# Patient Record
Sex: MALE | URBAN - METROPOLITAN AREA
[De-identification: names, ages, dates, MRNs, and addresses within clinical notes are randomized per-mention and may not be internally consistent; named-entity substitution may affect disease eponyms.]

---

## 2023-06-07 ENCOUNTER — OFFICE VISIT (OUTPATIENT)
Dept: PRIMARY CARE | Facility: CLINIC | Age: 48
End: 2023-06-07
Payer: COMMERCIAL

## 2023-06-07 VITALS
SYSTOLIC BLOOD PRESSURE: 135 MMHG | BODY MASS INDEX: 23.05 KG/M2 | WEIGHT: 161 LBS | OXYGEN SATURATION: 97 % | DIASTOLIC BLOOD PRESSURE: 86 MMHG | TEMPERATURE: 97.6 F | HEART RATE: 68 BPM | HEIGHT: 70 IN

## 2023-06-07 DIAGNOSIS — F43.0: Primary | ICD-10-CM

## 2023-06-07 PROCEDURE — 99213 OFFICE O/P EST LOW 20 MIN: CPT | Performed by: INTERNAL MEDICINE

## 2023-06-07 NOTE — PROGRESS NOTES
"Subjective   Troy Clayton is a 47 y.o. male who presents for Headache and Anxiety.  HPI    Fairly cute onset, thinks he may be experiencing anxiety/panic.  In many ways similar to episodes he had a last year but this time almost all of his symptoms were in his head.  Traveling for work, on Tuesday, driving back felt chest tightness, headache, head pressure.  When he landed in [place] he felt a lot of pressure in his head and felt very uncomfortable, rested, took ibuprofen, make sure to drink a lot of fluids.  Recovered after about 2 hours and felt fine within the next day when going to the plant he again developed feeling of fuzziness and pressure and had to leave.  Again, 2 or 3 hours later he felt totally fine.  Feels very exhausted despite getting better night sleep.  Dealing with a lot of stressors in the community because of his position although he has had these stressors for a long time and do not necessarily always cause the symptoms.  Described as a very uncomfortable feeling in uneasiness and heaviness in his head.  We did discuss and realized that all of the worst symptoms occurred when he was feeling trapped such as in the car or in the plant, had a sensation of needing to escape.  He did recently get a death threat on top of the other stressors and has had poor sleep because of the new baby.  Wants to rule out other causes such as strep as a lot of strep in the house and he has had a very mild sore throat, also has been his regular seasonal allergies with congestion.  Objective   /86   Pulse 68   Temp 36.4 °C (97.6 °F)   Ht 1.778 m (5' 10\")   Wt 73 kg (161 lb)   SpO2 97%   BMI 23.10 kg/m²    Physical Exam  Gen: NAD, pleasant, A&Ox3  HEENT: PERRL, EOMI, MMM, OP clear  Neck: supple, no thyromegaly, no JVD, normal carotid upstroke  Pulm: lungs CTAB, good air movement  CV: RRR, no m/r/g, 2+ DP pulses  Abd: NABS, soft, NT, ND no HSM  Ext: no peripheral edema  Neuro: CN II-XII intact, no focal " sensory or motor deficits, normal reflexes  Assessment/Plan     Suspected stress reaction: May be manifesting based on underlying symptoms such as head congestion so feeling more distress in his head as opposed to last year when he had more of the chest symptoms.  Can try some antihistamines instead of ignoring his allergy symptoms.  Discussed stressors and management, agrees to consider speaking with a psychologist to help compartmentalize his responsibilities and various aspects of his life.  Problem List Items Addressed This Visit    None           Nik Larry MD

## 2023-06-09 PROBLEM — F43.0: Status: ACTIVE | Noted: 2023-06-09

## 2023-06-20 DIAGNOSIS — J30.1 HAY FEVER: Primary | ICD-10-CM

## 2023-06-20 RX ORDER — CETIRIZINE HYDROCHLORIDE 10 MG/1
10 TABLET ORAL DAILY
Qty: 30 TABLET | Refills: 2 | Status: SHIPPED | OUTPATIENT
Start: 2023-06-20 | End: 2023-07-05 | Stop reason: SDUPTHER

## 2023-06-20 NOTE — PROGRESS NOTES
Subjective   Troy Clayton is a 47 y.o. male who presents for No chief complaint on file..  HPI    Objective   There were no vitals taken for this visit.   Physical Exam    Assessment/Plan   Problem List Items Addressed This Visit    None           Nik Larry MD

## 2023-07-05 ENCOUNTER — OFFICE VISIT (OUTPATIENT)
Dept: PRIMARY CARE | Facility: CLINIC | Age: 48
End: 2023-07-05
Payer: COMMERCIAL

## 2023-07-05 VITALS
TEMPERATURE: 97.9 F | WEIGHT: 162 LBS | HEART RATE: 74 BPM | BODY MASS INDEX: 23.24 KG/M2 | DIASTOLIC BLOOD PRESSURE: 70 MMHG | OXYGEN SATURATION: 96 % | SYSTOLIC BLOOD PRESSURE: 128 MMHG

## 2023-07-05 DIAGNOSIS — J30.1 HAY FEVER: ICD-10-CM

## 2023-07-05 DIAGNOSIS — E55.9 VITAMIN D DEFICIENCY: ICD-10-CM

## 2023-07-05 DIAGNOSIS — Z12.12 ENCOUNTER FOR COLORECTAL CANCER SCREENING: ICD-10-CM

## 2023-07-05 DIAGNOSIS — F43.0: ICD-10-CM

## 2023-07-05 DIAGNOSIS — Z00.00 ENCOUNTER FOR WELLNESS EXAMINATION: Primary | ICD-10-CM

## 2023-07-05 DIAGNOSIS — Z12.11 ENCOUNTER FOR COLORECTAL CANCER SCREENING: ICD-10-CM

## 2023-07-05 PROBLEM — R07.89 CHEST PAIN, ATYPICAL: Status: RESOLVED | Noted: 2023-07-05 | Resolved: 2023-07-05

## 2023-07-05 PROBLEM — F41.0 PANIC ATTACKS: Status: ACTIVE | Noted: 2023-07-05

## 2023-07-05 PROBLEM — K12.0 APHTHOUS ULCER: Status: RESOLVED | Noted: 2023-07-05 | Resolved: 2023-07-05

## 2023-07-05 PROBLEM — L74.513 HYPERHIDROSIS OF FEET: Status: RESOLVED | Noted: 2023-07-05 | Resolved: 2023-07-05

## 2023-07-05 LAB
ALANINE AMINOTRANSFERASE (SGPT) (U/L) IN SER/PLAS: 16 U/L (ref 10–52)
ALBUMIN (G/DL) IN SER/PLAS: 4.6 G/DL (ref 3.4–5)
ALKALINE PHOSPHATASE (U/L) IN SER/PLAS: 56 U/L (ref 33–120)
ANION GAP IN SER/PLAS: 12 MMOL/L (ref 10–20)
ASPARTATE AMINOTRANSFERASE (SGOT) (U/L) IN SER/PLAS: 15 U/L (ref 9–39)
BILIRUBIN TOTAL (MG/DL) IN SER/PLAS: 0.6 MG/DL (ref 0–1.2)
CALCIDIOL (25 OH VITAMIN D3) (NG/ML) IN SER/PLAS: 24 NG/ML
CALCIUM (MG/DL) IN SER/PLAS: 9.8 MG/DL (ref 8.6–10.6)
CARBON DIOXIDE, TOTAL (MMOL/L) IN SER/PLAS: 29 MMOL/L (ref 21–32)
CHLORIDE (MMOL/L) IN SER/PLAS: 105 MMOL/L (ref 98–107)
CHOLESTEROL (MG/DL) IN SER/PLAS: 201 MG/DL (ref 0–199)
CHOLESTEROL IN HDL (MG/DL) IN SER/PLAS: 64.3 MG/DL
CHOLESTEROL/HDL RATIO: 3.1
CREATININE (MG/DL) IN SER/PLAS: 0.96 MG/DL (ref 0.5–1.3)
ERYTHROCYTE DISTRIBUTION WIDTH (RATIO) BY AUTOMATED COUNT: 12.6 % (ref 11.5–14.5)
ERYTHROCYTE MEAN CORPUSCULAR HEMOGLOBIN CONCENTRATION (G/DL) BY AUTOMATED: 33.5 G/DL (ref 32–36)
ERYTHROCYTE MEAN CORPUSCULAR VOLUME (FL) BY AUTOMATED COUNT: 88 FL (ref 80–100)
ERYTHROCYTES (10*6/UL) IN BLOOD BY AUTOMATED COUNT: 4.66 X10E12/L (ref 4.5–5.9)
GFR MALE: >90 ML/MIN/1.73M2
GLUCOSE (MG/DL) IN SER/PLAS: 91 MG/DL (ref 74–99)
HEMATOCRIT (%) IN BLOOD BY AUTOMATED COUNT: 40.9 % (ref 41–52)
HEMOGLOBIN (G/DL) IN BLOOD: 13.7 G/DL (ref 13.5–17.5)
LDL: 106 MG/DL (ref 0–99)
LEUKOCYTES (10*3/UL) IN BLOOD BY AUTOMATED COUNT: 5.2 X10E9/L (ref 4.4–11.3)
NRBC (PER 100 WBCS) BY AUTOMATED COUNT: 0 /100 WBC (ref 0–0)
PLATELETS (10*3/UL) IN BLOOD AUTOMATED COUNT: 226 X10E9/L (ref 150–450)
POTASSIUM (MMOL/L) IN SER/PLAS: 4.4 MMOL/L (ref 3.5–5.3)
PROSTATE SPECIFIC AG (NG/ML) IN SER/PLAS: 0.34 NG/ML (ref 0–4)
PROTEIN TOTAL: 7.1 G/DL (ref 6.4–8.2)
SODIUM (MMOL/L) IN SER/PLAS: 142 MMOL/L (ref 136–145)
THYROTROPIN (MIU/L) IN SER/PLAS BY DETECTION LIMIT <= 0.05 MIU/L: 1.28 MIU/L (ref 0.44–3.98)
TRIGLYCERIDE (MG/DL) IN SER/PLAS: 154 MG/DL (ref 0–149)
UREA NITROGEN (MG/DL) IN SER/PLAS: 14 MG/DL (ref 6–23)
VLDL: 31 MG/DL (ref 0–40)

## 2023-07-05 PROCEDURE — 82306 VITAMIN D 25 HYDROXY: CPT

## 2023-07-05 PROCEDURE — 84153 ASSAY OF PSA TOTAL: CPT

## 2023-07-05 PROCEDURE — 84443 ASSAY THYROID STIM HORMONE: CPT

## 2023-07-05 PROCEDURE — 80053 COMPREHEN METABOLIC PANEL: CPT

## 2023-07-05 PROCEDURE — 99212 OFFICE O/P EST SF 10 MIN: CPT | Performed by: INTERNAL MEDICINE

## 2023-07-05 PROCEDURE — 99396 PREV VISIT EST AGE 40-64: CPT | Performed by: INTERNAL MEDICINE

## 2023-07-05 PROCEDURE — 80061 LIPID PANEL: CPT

## 2023-07-05 PROCEDURE — 85027 COMPLETE CBC AUTOMATED: CPT

## 2023-07-05 RX ORDER — CHOLECALCIFEROL (VITAMIN D3) 25 MCG
50 TABLET ORAL DAILY
Qty: 180 TABLET | Refills: 3 | Status: SHIPPED | OUTPATIENT
Start: 2023-07-05 | End: 2024-07-04

## 2023-07-05 RX ORDER — CETIRIZINE HYDROCHLORIDE 10 MG/1
10 TABLET ORAL DAILY
Qty: 30 TABLET | Refills: 2 | Status: SHIPPED | OUTPATIENT
Start: 2023-07-05 | End: 2023-08-22

## 2023-07-05 RX ORDER — CHOLECALCIFEROL (VITAMIN D3) 25 MCG
TABLET ORAL
COMMUNITY
Start: 2021-02-09 | End: 2023-07-05 | Stop reason: SDUPTHER

## 2023-07-05 RX ORDER — ALPRAZOLAM 0.25 MG/1
TABLET ORAL
COMMUNITY
Start: 2022-09-14 | End: 2023-08-15 | Stop reason: SDUPTHER

## 2023-07-05 NOTE — PROGRESS NOTES
"Subjective   Troy Clayton is a 47 y.o. male who presents for Annual Exam.  HPI  Interim:  -See visit 6/7/2023 regarding suspected stress reaction and anxiety.  He has been doing more walking and swimming, exercise certainly helps and he does feel better overall.  He has had a recurrence of the symptoms of chest tightness and uneasiness on a few occasions, most notably when sitting at the [employer] table and driving to [place].  Both situations where he was in a sense \"stuck\" sitting and responsible to continue doing what he was doing.  Continued stress through his position.  He has been speaking to his friend and retired psychologist Dr. ePtit looking to start regular therapy.  Also trying to set boundaries and separate from stretches when possible.  Working on relaxation exercises and breathing.  He does have that alprazolam on hand but has not taken them.    We did put him on cetirizine and that seems to have helped with head congestion.    Continue seeing Dr. Holman for PT for his shoulders which seems to be working pretty well.    Family history:  Father (@ age 56) and paternal grandfather-CVA, heart disease and diabetes  Mother and brother-kidney stones  Mother-breast cancer  Maternal grandfather-lung cancer but longtime smoker       Objective   /70   Pulse 74   Temp 36.6 °C (97.9 °F)   Wt 73.5 kg (162 lb)   SpO2 96%   BMI 23.24 kg/m²    Physical Exam  Gen: NAD, pleasant, A&Ox3  HEENT: PERRL, EOMI, MMM, OP clear  Neck: supple, no thyromegaly, no JVD, normal carotid upstroke  Pulm: lungs CTAB, good air movement  CV: RRR, no m/r/g, 2+ DP pulses  Abd: NABS, soft, NT, ND no HSM  Ext: no peripheral edema  Neuro: CN II-XII intact, no focal sensory or motor deficits, normal reflexes  Assessment/Plan     Anxiety/Panic attacks: Discussed anxiety management, mindfulness, speaking to a therapist. Trial of as needed Xanax     Elevated blood pressure: Mostly resolved, acceptable today     Vitamin D " deficiency: Recheck, continue supplementation     Health maintenance  -Last colonoscopy: Initiate at 47, prefers Cologuard  -PSA: Check baseline  -Smoking history: Never  -Counseled regarding diet and exercise  -Immunizations: Check record  -Followup annually and as needed   Problem List Items Addressed This Visit    None           Nik Larry MD

## 2023-07-20 LAB — NONINV COLON CA DNA+OCC BLD SCRN STL QL: NEGATIVE

## 2023-08-15 DIAGNOSIS — F41.0 PANIC ATTACKS: Primary | ICD-10-CM

## 2023-08-15 RX ORDER — ALPRAZOLAM 0.25 MG/1
0.25 TABLET ORAL 3 TIMES DAILY PRN
Qty: 14 TABLET | Refills: 0 | Status: SHIPPED | OUTPATIENT
Start: 2023-08-15 | End: 2024-03-08 | Stop reason: SDUPTHER

## 2023-08-16 RX ORDER — ALPRAZOLAM 0.25 MG/1
TABLET ORAL
Qty: 7 TABLET | OUTPATIENT
Start: 2023-08-16

## 2023-08-22 DIAGNOSIS — J30.1 HAY FEVER: ICD-10-CM

## 2023-08-22 RX ORDER — CETIRIZINE HYDROCHLORIDE 10 MG/1
10 TABLET ORAL DAILY
Qty: 90 TABLET | Refills: 3 | Status: SHIPPED | OUTPATIENT
Start: 2023-08-22

## 2023-11-17 DIAGNOSIS — M25.519 CHRONIC SHOULDER PAIN, UNSPECIFIED LATERALITY: Primary | ICD-10-CM

## 2023-11-17 DIAGNOSIS — G89.29 CHRONIC SHOULDER PAIN, UNSPECIFIED LATERALITY: Primary | ICD-10-CM

## 2023-11-17 NOTE — PROGRESS NOTES
Subjective   Troy Clayton is a 48 y.o. male who presents for No chief complaint on file..  HPI    Objective   There were no vitals taken for this visit.   Physical Exam    Assessment/Plan   Problem List Items Addressed This Visit    None           Nik Larry MD    B positive

## 2023-11-27 ENCOUNTER — OFFICE VISIT (OUTPATIENT)
Dept: ORTHOPEDIC SURGERY | Facility: HOSPITAL | Age: 48
End: 2023-11-27
Payer: COMMERCIAL

## 2023-11-27 DIAGNOSIS — G89.29 CHRONIC RIGHT SHOULDER PAIN: Primary | ICD-10-CM

## 2023-11-27 DIAGNOSIS — M25.511 CHRONIC RIGHT SHOULDER PAIN: Primary | ICD-10-CM

## 2023-11-27 PROCEDURE — 99214 OFFICE O/P EST MOD 30 MIN: CPT | Performed by: ORTHOPAEDIC SURGERY

## 2023-11-27 PROCEDURE — 1036F TOBACCO NON-USER: CPT | Performed by: ORTHOPAEDIC SURGERY

## 2023-11-27 PROCEDURE — 20610 DRAIN/INJ JOINT/BURSA W/O US: CPT | Performed by: ORTHOPAEDIC SURGERY

## 2023-11-27 PROCEDURE — 99214 OFFICE O/P EST MOD 30 MIN: CPT | Mod: 25 | Performed by: ORTHOPAEDIC SURGERY

## 2023-11-27 PROCEDURE — 2500000004 HC RX 250 GENERAL PHARMACY W/ HCPCS (ALT 636 FOR OP/ED): Performed by: ORTHOPAEDIC SURGERY

## 2023-11-27 PROCEDURE — 2500000005 HC RX 250 GENERAL PHARMACY W/O HCPCS: Performed by: ORTHOPAEDIC SURGERY

## 2023-11-27 RX ORDER — TRIAMCINOLONE ACETONIDE 40 MG/ML
40 INJECTION, SUSPENSION INTRA-ARTICULAR; INTRAMUSCULAR
Status: COMPLETED | OUTPATIENT
Start: 2023-11-27 | End: 2023-11-27

## 2023-11-27 RX ORDER — LIDOCAINE HYDROCHLORIDE 10 MG/ML
1 INJECTION INFILTRATION; PERINEURAL
Status: COMPLETED | OUTPATIENT
Start: 2023-11-27 | End: 2023-11-27

## 2023-11-27 RX ORDER — LIDOCAINE HYDROCHLORIDE 10 MG/ML
4 INJECTION INFILTRATION; PERINEURAL
Status: COMPLETED | OUTPATIENT
Start: 2023-11-27 | End: 2023-11-27

## 2023-11-27 RX ORDER — TRIAMCINOLONE ACETONIDE 40 MG/ML
10 INJECTION, SUSPENSION INTRA-ARTICULAR; INTRAMUSCULAR
Status: COMPLETED | OUTPATIENT
Start: 2023-11-27 | End: 2023-11-27

## 2023-11-27 RX ADMIN — LIDOCAINE HYDROCHLORIDE 4 ML: 10 INJECTION, SOLUTION INFILTRATION; PERINEURAL at 13:56

## 2023-11-27 RX ADMIN — TRIAMCINOLONE ACETONIDE 10 MG: 400 INJECTION, SUSPENSION INTRA-ARTICULAR; INTRAMUSCULAR at 13:59

## 2023-11-27 RX ADMIN — TRIAMCINOLONE ACETONIDE 40 MG: 200 INJECTION, SUSPENSION INTRA-ARTICULAR; INTRAMUSCULAR at 13:56

## 2023-11-27 RX ADMIN — LIDOCAINE HYDROCHLORIDE 1 ML: 10 INJECTION, SOLUTION INFILTRATION; PERINEURAL at 13:59

## 2023-11-27 NOTE — PROGRESS NOTES
L Inj/Asp: R subacromial bursa on 11/27/2023 1:56 PM  Indications: pain  Details: 22 G needle, posterior approach  Medications: 40 mg triamcinolone acetonide 40 mg/mL; 4 mL lidocaine 10 mg/mL (1 %)  Procedure, treatment alternatives, risks and benefits explained, specific risks discussed. Consent was given by the patient.

## 2023-11-27 NOTE — PROGRESS NOTES
Patient here for evaluation of his right shoulder he has recurrent pain in his right shoulder is actually been persistent for several months.  He has been seeing a physical therapist regularly and has improved slightly continues to have pain in the shoulder into the deltoid worse when he lays on his side.  Worse with overhead usage.    The patient is pleasant and cooperative.  The patient is alert and oriented ×3.  Auditory function is intact.  The patient is a good historian.  The patient is not in acute distress.  Eye exam significant for nonicteric sclera, intact ocular muscle movement.  Breathing is rhythmic symmetric and nonlabored.  Right upper extremity integument intact no lesions scars lacerations abrasions or contusions radial pulse easily palpable brisk capillary refill light touch sensation intact of the right upper extremity.  Patient is active shoulder motion elevation 180 external rotation and abduction 95 internal rotation T8.  Motor power abduction 5 external rotation 5 internal rotation 5.  No apprehension.  Pain with extremes of range of motion particularly abduction in neutral rotation.    X-rays show preservation of the subacromial glenohumeral space no fracture dislocation subluxation or arthritic changes noted.    Right shoulder impingement    Patient has impingement likely associated with subacromial bursitis.  Is been recalcitrant to treatment and I recommended further treatment by injection.  Injection was performed today and tolerated well.  I have asked patient to call and advise us of the results of the injection in about a week.L Inj/Asp: R subacromial bursa on 11/27/2023 1:59 PM  Indications: pain  Details: 22 G needle, posterior approach  Medications: 10 mg triamcinolone acetonide 40 mg/mL; 1 mL lidocaine 10 mg/mL (1 %)

## 2024-03-08 DIAGNOSIS — F41.0 PANIC ATTACKS: ICD-10-CM

## 2024-03-08 RX ORDER — ALPRAZOLAM 0.25 MG/1
0.25 TABLET ORAL 3 TIMES DAILY PRN
Qty: 14 TABLET | Refills: 0 | Status: SHIPPED | OUTPATIENT
Start: 2024-03-08 | End: 2024-03-15

## 2024-04-11 ENCOUNTER — OFFICE VISIT (OUTPATIENT)
Dept: PRIMARY CARE | Facility: CLINIC | Age: 49
End: 2024-04-11
Payer: COMMERCIAL

## 2024-04-11 VITALS
HEART RATE: 70 BPM | WEIGHT: 154 LBS | DIASTOLIC BLOOD PRESSURE: 77 MMHG | OXYGEN SATURATION: 97 % | BODY MASS INDEX: 22.1 KG/M2 | SYSTOLIC BLOOD PRESSURE: 135 MMHG | TEMPERATURE: 98.6 F

## 2024-04-11 DIAGNOSIS — J01.10 ACUTE NON-RECURRENT FRONTAL SINUSITIS: ICD-10-CM

## 2024-04-11 DIAGNOSIS — B34.9 VIRAL SYNDROME: ICD-10-CM

## 2024-04-11 DIAGNOSIS — K12.0 APHTHOUS STOMATITIS: Primary | ICD-10-CM

## 2024-04-11 PROCEDURE — 99214 OFFICE O/P EST MOD 30 MIN: CPT | Performed by: INTERNAL MEDICINE

## 2024-04-11 PROCEDURE — 1036F TOBACCO NON-USER: CPT | Performed by: INTERNAL MEDICINE

## 2024-04-11 RX ORDER — DEXAMETHASONE 0.5 MG/5ML
ELIXIR ORAL
Qty: 100 ML | Refills: 0 | Status: SHIPPED | OUTPATIENT
Start: 2024-04-11

## 2024-04-11 RX ORDER — FLUTICASONE PROPIONATE 50 MCG
1 SPRAY, SUSPENSION (ML) NASAL 2 TIMES DAILY
Qty: 16 G | Refills: 11 | Status: SHIPPED | OUTPATIENT
Start: 2024-04-11 | End: 2024-04-25

## 2024-04-11 RX ORDER — SOD CHLOR,BICARB/SQUEEZ BOTTLE
1 PACKET, WITH RINSE DEVICE NASAL 2 TIMES DAILY
Qty: 30 EACH | Refills: 0 | Status: SHIPPED | OUTPATIENT
Start: 2024-04-11 | End: 2024-04-25

## 2024-04-11 RX ORDER — OXYMETAZOLINE HYDROCHLORIDE 0.05 G/100ML
2 SPRAY, METERED NASAL EVERY 12 HOURS PRN
Qty: 30 ML | Refills: 0 | Status: SHIPPED | OUTPATIENT
Start: 2024-04-11 | End: 2024-04-14

## 2024-04-11 RX ORDER — AMOXICILLIN AND CLAVULANATE POTASSIUM 875; 125 MG/1; MG/1
875 TABLET, FILM COATED ORAL 2 TIMES DAILY
Qty: 10 TABLET | Refills: 0 | Status: SHIPPED | OUTPATIENT
Start: 2024-04-11 | End: 2024-04-16

## 2024-04-11 NOTE — PATIENT INSTRUCTIONS
Twice daily, in each nostril, spray 1-2 doses of Afrin (oxymetazoline) followed by nasal sinus wash (squeeze bottle or Jaylyn pot, i.e. NeilMed) followed by 1 spray in each nostril of nasal inhaled corticosteroid (i.e. Flonase, Nasacort, fluticasone, mometasone etc.)  After the first 3 days, STOP Afrin and continue the sinus wash and nasal steroid for at least 1-2 weeks and until symptoms are improved.

## 2024-07-05 ENCOUNTER — APPOINTMENT (OUTPATIENT)
Dept: PRIMARY CARE | Facility: CLINIC | Age: 49
End: 2024-07-05
Payer: COMMERCIAL

## 2024-07-05 VITALS
TEMPERATURE: 97.8 F | DIASTOLIC BLOOD PRESSURE: 85 MMHG | HEART RATE: 75 BPM | OXYGEN SATURATION: 97 % | WEIGHT: 157 LBS | SYSTOLIC BLOOD PRESSURE: 129 MMHG | HEIGHT: 67 IN | BODY MASS INDEX: 24.64 KG/M2

## 2024-07-05 DIAGNOSIS — F41.0 PANIC ATTACKS: ICD-10-CM

## 2024-07-05 DIAGNOSIS — J30.1 HAY FEVER: ICD-10-CM

## 2024-07-05 DIAGNOSIS — H53.9 VISION CHANGES: ICD-10-CM

## 2024-07-05 DIAGNOSIS — Z00.00 ENCOUNTER FOR WELLNESS EXAMINATION: Primary | ICD-10-CM

## 2024-07-05 PROCEDURE — 1036F TOBACCO NON-USER: CPT | Performed by: INTERNAL MEDICINE

## 2024-07-05 PROCEDURE — 99213 OFFICE O/P EST LOW 20 MIN: CPT | Performed by: INTERNAL MEDICINE

## 2024-07-05 PROCEDURE — 99396 PREV VISIT EST AGE 40-64: CPT | Performed by: INTERNAL MEDICINE

## 2024-07-05 RX ORDER — CETIRIZINE HYDROCHLORIDE 10 MG/1
10 TABLET ORAL DAILY
Qty: 90 TABLET | Refills: 3 | Status: SHIPPED | OUTPATIENT
Start: 2024-07-05

## 2024-07-05 RX ORDER — ALPRAZOLAM 0.25 MG/1
0.25 TABLET ORAL 3 TIMES DAILY PRN
Qty: 18 TABLET | Refills: 0 | Status: SHIPPED | OUTPATIENT
Start: 2024-07-05 | End: 2024-07-12

## 2024-07-05 ASSESSMENT — PAIN SCALES - GENERAL: PAINLEVEL: 0-NO PAIN

## 2024-07-05 NOTE — PROGRESS NOTES
"Subjective   Troy Clayton is a 48 y.o. male who presents for Annual Exam.  HPI  Interim:  - saw Dr. Arambula, 11/27/2023, had ICS of shoulder with resolution  - seen here in April with sinusitis    Could use more sleep.    Scheduling/compartmentalizing has helped with anxiety.  Also hoping to join [employer] for a seder.  See visits in mid-2023 for details.  Has used Xanax on a few occasions, it takes away the tightness in the chest and therefore prevents spiraling into a worse situation.    Has not been exercising as much.  Goes for brisk walks occasionally but mostly just leisure.      Family history:  Father (@ age 56) and paternal grandfather-CVA, heart disease and diabetes  Mother and brother-kidney stones  Mother-breast cancer  Maternal grandfather-lung cancer but longtime smoker  Brothers - anxiety       Objective   /85 (BP Location: Left arm, Patient Position: Sitting, BP Cuff Size: Adult)   Pulse 75   Temp 36.6 °C (97.8 °F) (Oral)   Ht 1.702 m (5' 7\")   Wt 71.2 kg (157 lb)   SpO2 97%   BMI 24.59 kg/m²    Physical Exam  Gen: NAD, pleasant, A&Ox3  HEENT: PERRL, EOMI, MMM, OP clear  Neck: supple, no thyromegaly, no JVD, normal carotid upstroke  Pulm: lungs CTAB, good air movement  CV: RRR, no m/r/g, 2+ DP pulses  Abd: NABS, soft, NT, ND no HSM  Ext: no peripheral edema  Neuro: CN II-XII intact, no focal sensory or motor deficits, normal reflexes  Assessment/Plan     Anxiety/Panic attacks: Discussed anxiety management, mindfulness, speaking to a therapist. Continue as needed Xanax     Elevated blood pressure: Mostly resolved, acceptable today     Vitamin D deficiency: continue supplementation     Health maintenance  -Last colonoscopy:  prefers Cologuard, normal in 2023  -PSA: 0.34ng/mL  - metabolic screening okay in 2023  -Smoking history: Never  -Counseled regarding diet and exercise  -Immunizations: Check record  -Followup annually and as needed   Problem List Items Addressed This Visit    None       "     Nik Larry MD

## 2024-07-23 ENCOUNTER — TELEPHONE (OUTPATIENT)
Dept: PRIMARY CARE | Facility: CLINIC | Age: 49
End: 2024-07-23

## 2024-07-31 DIAGNOSIS — F41.1 GENERALIZED ANXIETY DISORDER: Primary | ICD-10-CM

## 2024-07-31 RX ORDER — ESCITALOPRAM OXALATE 5 MG/1
5 TABLET ORAL DAILY
Qty: 30 TABLET | Refills: 2 | Status: SHIPPED | OUTPATIENT
Start: 2024-07-31 | End: 2024-10-29

## 2024-08-22 DIAGNOSIS — F41.1 GENERALIZED ANXIETY DISORDER: ICD-10-CM

## 2024-08-23 RX ORDER — ESCITALOPRAM OXALATE 5 MG/1
5 TABLET ORAL DAILY
Qty: 90 TABLET | Refills: 1 | Status: SHIPPED | OUTPATIENT
Start: 2024-08-23

## 2024-08-26 DIAGNOSIS — F41.0 PANIC ATTACKS: ICD-10-CM

## 2024-08-26 RX ORDER — ALPRAZOLAM 0.25 MG/1
0.25 TABLET ORAL 3 TIMES DAILY PRN
Qty: 18 TABLET | Refills: 0 | Status: SHIPPED | OUTPATIENT
Start: 2024-08-26 | End: 2024-09-02

## 2024-11-14 ENCOUNTER — APPOINTMENT (OUTPATIENT)
Dept: OPHTHALMOLOGY | Facility: CLINIC | Age: 49
End: 2024-11-14
Payer: COMMERCIAL

## 2024-11-14 DIAGNOSIS — H52.13 MYOPIA OF BOTH EYES WITH ASTIGMATISM AND PRESBYOPIA: Primary | ICD-10-CM

## 2024-11-14 DIAGNOSIS — H52.203 MYOPIA OF BOTH EYES WITH ASTIGMATISM AND PRESBYOPIA: Primary | ICD-10-CM

## 2024-11-14 DIAGNOSIS — H53.9 VISION CHANGES: ICD-10-CM

## 2024-11-14 DIAGNOSIS — F41.1 GENERALIZED ANXIETY DISORDER: ICD-10-CM

## 2024-11-14 DIAGNOSIS — H52.4 MYOPIA OF BOTH EYES WITH ASTIGMATISM AND PRESBYOPIA: Primary | ICD-10-CM

## 2024-11-14 DIAGNOSIS — J30.1 HAY FEVER: ICD-10-CM

## 2024-11-14 PROCEDURE — 92015 DETERMINE REFRACTIVE STATE: CPT | Performed by: OPTOMETRIST

## 2024-11-14 PROCEDURE — 92004 COMPRE OPH EXAM NEW PT 1/>: CPT | Performed by: OPTOMETRIST

## 2024-11-14 RX ORDER — ESCITALOPRAM OXALATE 5 MG/1
5 TABLET ORAL DAILY
Qty: 90 TABLET | Refills: 1 | Status: SHIPPED | OUTPATIENT
Start: 2024-11-14

## 2024-11-14 RX ORDER — CETIRIZINE HYDROCHLORIDE 10 MG/1
10 TABLET ORAL DAILY
Qty: 90 TABLET | Refills: 3 | Status: SHIPPED | OUTPATIENT
Start: 2024-11-14

## 2024-11-14 ASSESSMENT — CONF VISUAL FIELD
OD_SUPERIOR_NASAL_RESTRICTION: 0
OS_SUPERIOR_NASAL_RESTRICTION: 0
OD_NORMAL: 1
OD_INFERIOR_NASAL_RESTRICTION: 0
OD_SUPERIOR_TEMPORAL_RESTRICTION: 0
OD_INFERIOR_TEMPORAL_RESTRICTION: 0
OS_INFERIOR_NASAL_RESTRICTION: 0
OS_NORMAL: 1
OS_SUPERIOR_TEMPORAL_RESTRICTION: 0
OS_INFERIOR_TEMPORAL_RESTRICTION: 0

## 2024-11-14 ASSESSMENT — REFRACTION_MANIFEST
METHOD_AUTOREFRACTION: 1
OD_AXIS: 130
OS_SPHERE: -5.50
OS_CYLINDER: -0.25
OD_AXIS: 135
OS_AXIS: 060
OS_CYLINDER: -0.50
OD_CYLINDER: -0.50
OS_SPHERE: -5.25
OD_SPHERE: -1.25
OS_AXIS: 040
OD_SPHERE: -1.25
OD_CYLINDER: -0.50
OS_ADD: +1.50
OD_ADD: +1.50

## 2024-11-14 ASSESSMENT — ENCOUNTER SYMPTOMS
MUSCULOSKELETAL NEGATIVE: 0
GASTROINTESTINAL NEGATIVE: 0
HEMATOLOGIC/LYMPHATIC NEGATIVE: 0
ENDOCRINE NEGATIVE: 0
EYES NEGATIVE: 1
ALLERGIC/IMMUNOLOGIC NEGATIVE: 0
NEUROLOGICAL NEGATIVE: 0
PSYCHIATRIC NEGATIVE: 0
RESPIRATORY NEGATIVE: 0
CARDIOVASCULAR NEGATIVE: 0
CONSTITUTIONAL NEGATIVE: 0

## 2024-11-14 ASSESSMENT — VISUAL ACUITY
OS_CC: 20/20
METHOD: SNELLEN - LINEAR
OD_CC: 20/20
CORRECTION_TYPE: GLASSES

## 2024-11-14 ASSESSMENT — TONOMETRY
OS_IOP_MMHG: 16
IOP_METHOD: GOLDMANN APPLANATION
OD_IOP_MMHG: 16

## 2024-11-14 ASSESSMENT — REFRACTION_WEARINGRX
OS_AXIS: 043
OD_CYLINDER: -0.25
OD_SPHERE: -1.00
OS_CYLINDER: -1.00
OS_SPHERE: -4.75
OD_AXIS: 148

## 2024-11-14 ASSESSMENT — SLIT LAMP EXAM - LIDS
COMMENTS: NORMAL
COMMENTS: NORMAL

## 2024-11-14 ASSESSMENT — EXTERNAL EXAM - LEFT EYE: OS_EXAM: NORMAL

## 2024-11-14 ASSESSMENT — EXTERNAL EXAM - RIGHT EYE: OD_EXAM: NORMAL

## 2024-11-14 NOTE — PROGRESS NOTES
Anisometropia and recent Hx of getting PALs and doesn't like them and feels strained.  Reviewed that single vsiin glasses for distance and near are better and patient may have some accommodative spasm as well to have occasional distance blur.    The patient was asked to return to our clinic in one year or sooner if ocular or vision changes occur.

## 2024-12-16 ENCOUNTER — OFFICE VISIT (OUTPATIENT)
Dept: PRIMARY CARE | Facility: CLINIC | Age: 49
End: 2024-12-16
Payer: COMMERCIAL

## 2024-12-16 DIAGNOSIS — E55.9 VITAMIN D DEFICIENCY: ICD-10-CM

## 2024-12-16 DIAGNOSIS — K12.0 ORAL APHTHOUS ULCER: Primary | ICD-10-CM

## 2024-12-16 DIAGNOSIS — K12.0 APHTHOUS STOMATITIS: ICD-10-CM

## 2024-12-16 PROCEDURE — 99213 OFFICE O/P EST LOW 20 MIN: CPT | Performed by: INTERNAL MEDICINE

## 2024-12-16 RX ORDER — CHOLECALCIFEROL (VITAMIN D3) 25 MCG
2000 TABLET ORAL DAILY
Qty: 180 TABLET | Refills: 3 | Status: SHIPPED | OUTPATIENT
Start: 2024-12-16 | End: 2025-12-16

## 2024-12-16 RX ORDER — DEXAMETHASONE 0.5 MG/5ML
ELIXIR ORAL
Qty: 100 ML | Refills: 0 | Status: SHIPPED | OUTPATIENT
Start: 2024-12-16

## 2024-12-16 NOTE — PROGRESS NOTES
Bed: 09  Expected date: 10/15/24  Expected time:   Means of arrival: [employer] [facility]  Comments:  88F BB sob/afib rvr   Subjective   Patient ID: Troy Clayton is a 49 y.o. male who presents for Sore Throat.    Approximately 2 weeks of sore throat, mostly posterior right, painful swallowing.  Managing with ibuprofen.  No fevers chills or sweats, has not felt sick or under the weather, no cough or upper respiratory symptoms.  One of their children had cold last week.  Multiple stressors, doing a lot of traveling, children with new baby boy staying with them, sleep is at a premium.  Dentist saw something abnormal during regular cleaning and had him come in for evaluation.  Objective   Physical Exam    There were no vitals taken for this visit.   NAD  No adenopathy  Moist mucous membranes  Right lower posterior buccal aphthous ulcer just anterior to the tonsil    Assessment/Plan     Recurrent aphthous stomatitis: Continue conservative/symptomatic management, trial of dexamethasone elixir tristian Larry MD

## 2025-06-27 DIAGNOSIS — J30.1 HAY FEVER: ICD-10-CM

## 2025-06-27 RX ORDER — CETIRIZINE HYDROCHLORIDE 10 MG/1
10 TABLET ORAL DAILY
Qty: 90 TABLET | Refills: 3 | Status: SHIPPED | OUTPATIENT
Start: 2025-06-27

## 2025-07-08 DIAGNOSIS — F41.0 PANIC ATTACKS: ICD-10-CM

## 2025-07-09 RX ORDER — ALPRAZOLAM 0.25 MG/1
0.25 TABLET ORAL 3 TIMES DAILY PRN
Qty: 12 TABLET | Refills: 0 | Status: SHIPPED | OUTPATIENT
Start: 2025-07-09

## 2025-07-11 ENCOUNTER — APPOINTMENT (OUTPATIENT)
Dept: PRIMARY CARE | Facility: CLINIC | Age: 50
End: 2025-07-11
Payer: COMMERCIAL

## 2025-07-11 VITALS
SYSTOLIC BLOOD PRESSURE: 130 MMHG | HEART RATE: 75 BPM | DIASTOLIC BLOOD PRESSURE: 87 MMHG | OXYGEN SATURATION: 93 % | WEIGHT: 163.35 LBS | BODY MASS INDEX: 23.38 KG/M2 | HEIGHT: 70 IN

## 2025-07-11 DIAGNOSIS — E55.9 VITAMIN D DEFICIENCY: ICD-10-CM

## 2025-07-11 DIAGNOSIS — Z13.6 SCREENING FOR CARDIOVASCULAR CONDITION: ICD-10-CM

## 2025-07-11 DIAGNOSIS — K12.0 APHTHOUS STOMATITIS: ICD-10-CM

## 2025-07-11 DIAGNOSIS — Z00.00 ENCOUNTER FOR WELLNESS EXAMINATION: Primary | ICD-10-CM

## 2025-07-11 PROCEDURE — 99396 PREV VISIT EST AGE 40-64: CPT | Performed by: INTERNAL MEDICINE

## 2025-07-11 PROCEDURE — G8433 SCR FOR DEP NOT CPT DOC RSN: HCPCS | Performed by: INTERNAL MEDICINE

## 2025-07-11 PROCEDURE — 99213 OFFICE O/P EST LOW 20 MIN: CPT | Performed by: INTERNAL MEDICINE

## 2025-07-11 PROCEDURE — 1036F TOBACCO NON-USER: CPT | Performed by: INTERNAL MEDICINE

## 2025-07-11 PROCEDURE — 3008F BODY MASS INDEX DOCD: CPT | Performed by: INTERNAL MEDICINE

## 2025-07-11 RX ORDER — DEXAMETHASONE 0.5 MG/5ML
ELIXIR ORAL
Qty: 100 ML | Refills: 0 | Status: SHIPPED | OUTPATIENT
Start: 2025-07-11

## 2025-07-11 ASSESSMENT — COLUMBIA-SUICIDE SEVERITY RATING SCALE - C-SSRS
2. HAVE YOU ACTUALLY HAD ANY THOUGHTS OF KILLING YOURSELF?: NO
6. HAVE YOU EVER DONE ANYTHING, STARTED TO DO ANYTHING, OR PREPARED TO DO ANYTHING TO END YOUR LIFE?: NO
1. IN THE PAST MONTH, HAVE YOU WISHED YOU WERE DEAD OR WISHED YOU COULD GO TO SLEEP AND NOT WAKE UP?: NO

## 2025-07-11 ASSESSMENT — PATIENT HEALTH QUESTIONNAIRE - PHQ9
2. FEELING DOWN, DEPRESSED OR HOPELESS: NOT AT ALL
1. LITTLE INTEREST OR PLEASURE IN DOING THINGS: NOT AT ALL
SUM OF ALL RESPONSES TO PHQ9 QUESTIONS 1 AND 2: 0

## 2025-07-11 NOTE — PROGRESS NOTES
"Subjective   Troy Clayton is a 49 y.o. male who presents for Annual Exam.  HPI    Had URI a few weeks ago, sore throat, sinus pain and pressure; last two days with headache and dryness around the lips and decreased taste.  Mild lingering cough.    Lots of stressors at home recently, teenagers at home have been a challenge.  Also with increased general and work stressors.    Needs to increase exercise      Objective   /87   Pulse 75   Ht 1.778 m (5' 10\")   Wt 74.1 kg (163 lb 5.6 oz)   SpO2 93%   BMI 23.44 kg/m²    Physical Exam  Gen: NAD, pleasant, A&Ox3  HEENT: PERRL, EOMI, MMM, OP clear, early sore on the tip of the tongue  Neck: supple, no thyromegaly, no JVD, normal carotid upstroke  Pulm: lungs CTAB, good air movement  CV: RRR, no m/r/g, 2+ DP pulses  Abd: NABS, soft, NT, ND no HSM  Ext: no peripheral edema  Neuro: CN II-XII intact, no focal sensory or motor deficits, normal reflexes  Assessment/Plan   Stress/canker sore: challenging time, needs to rest but difficult due to circumstances, refill dex elixir    Anxiety/Panic attacks: doing better  - did well with SSRI (completed >6 months and generally doing okay)  - seeing Dr. Parrish   - Continue as needed Xanax     Elevated blood pressure: Mostly resolved, acceptable today although slightly higher with stress  - check CT CAC and labs     Vitamin D deficiency: continue supplementation     Health maintenance  -Last colonoscopy:  prefers Cologuard, normal in 2023  -PSA: 0.34ng/mL  - metabolic screening okay in 2023  -Smoking history: Never  -Counseled regarding diet and exercise  -Immunizations: Check record  -Followup annually and as needed   Problem List Items Addressed This Visit    None           Nik Larry MD   "